# Patient Record
Sex: MALE | Race: BLACK OR AFRICAN AMERICAN | NOT HISPANIC OR LATINO | Employment: PART TIME | URBAN - METROPOLITAN AREA
[De-identification: names, ages, dates, MRNs, and addresses within clinical notes are randomized per-mention and may not be internally consistent; named-entity substitution may affect disease eponyms.]

---

## 2019-09-07 ENCOUNTER — HOSPITAL ENCOUNTER (EMERGENCY)
Facility: HOSPITAL | Age: 69
Discharge: HOME | End: 2019-09-07
Attending: EMERGENCY MEDICINE
Payer: MEDICARE

## 2019-09-07 VITALS
SYSTOLIC BLOOD PRESSURE: 177 MMHG | OXYGEN SATURATION: 98 % | RESPIRATION RATE: 18 BRPM | TEMPERATURE: 98 F | HEART RATE: 79 BPM | DIASTOLIC BLOOD PRESSURE: 89 MMHG

## 2019-09-07 DIAGNOSIS — R55 SYNCOPE: ICD-10-CM

## 2019-09-07 DIAGNOSIS — R11.10 VOMITING, INTRACTABILITY OF VOMITING NOT SPECIFIED, PRESENCE OF NAUSEA NOT SPECIFIED, UNSPECIFIED VOMITING TYPE: Primary | ICD-10-CM

## 2019-09-07 PROCEDURE — 99284 EMERGENCY DEPT VISIT MOD MDM: CPT | Mod: ,,, | Performed by: EMERGENCY MEDICINE

## 2019-09-07 PROCEDURE — 93010 EKG 12-LEAD: ICD-10-PCS | Mod: ,,, | Performed by: INTERNAL MEDICINE

## 2019-09-07 PROCEDURE — 99283 EMERGENCY DEPT VISIT LOW MDM: CPT | Mod: 25

## 2019-09-07 PROCEDURE — 93005 ELECTROCARDIOGRAM TRACING: CPT

## 2019-09-07 PROCEDURE — 99284 PR EMERGENCY DEPT VISIT,LEVEL IV: ICD-10-PCS | Mod: ,,, | Performed by: EMERGENCY MEDICINE

## 2019-09-07 PROCEDURE — 93010 ELECTROCARDIOGRAM REPORT: CPT | Mod: ,,, | Performed by: INTERNAL MEDICINE

## 2019-09-07 NOTE — ED PROVIDER NOTES
Encounter Date: 9/7/2019       History     Chief Complaint   Patient presents with    Pre Syncope     working in heat the past two days - N/V the past two days      Mr. Davison is a 68-year-old male brought in by EMS for evaluation of near syncopal event.  Patient states he had acute onset, severe, 8/10, cramping abdominal pain that began last night which was associated with vomiting. Symptoms were persistent until today while he was at work.  He states for the past 2 days he has been working outside in the heat and is felt dehydrated but unable to keep down liquids.  Last bowel movement was normal today.  Patient states while at work, he presented to paramedics to start an IV and give him 1.5 L of fluids.  He immediately felt resolution of symptoms.  He currently denies abdominal pain and feels his appetite has returned.        Review of patient's allergies indicates:  No Known Allergies  Past Medical History:   Diagnosis Date    Hypertension      Past Surgical History:   Procedure Laterality Date    FRACTURE SURGERY      LEG SURGERY Left      No family history on file.  Social History     Tobacco Use    Smoking status: Current Every Day Smoker     Packs/day: 0.50     Years: 45.00     Pack years: 22.50     Types: Cigarettes   Substance Use Topics    Alcohol use: No    Drug use: No     Review of Systems   Constitutional: Negative for chills and fever.   HENT: Negative for congestion and sore throat.    Eyes: Negative for visual disturbance.   Respiratory: Negative for cough and shortness of breath.    Cardiovascular: Negative for chest pain, palpitations and leg swelling.   Gastrointestinal: Positive for abdominal pain, nausea and vomiting.   Genitourinary: Negative for dysuria.   Musculoskeletal: Negative for back pain and joint swelling.   Skin: Negative for pallor.   Neurological: Positive for syncope (Near syncopal), weakness and light-headedness.   Psychiatric/Behavioral: Negative for confusion.        Physical Exam     Initial Vitals [09/07/19 1713]   BP Pulse Resp Temp SpO2   (!) 140/80 73 18 98.5 °F (36.9 °C) 100 %      MAP       --         Physical Exam    Nursing note and vitals reviewed.  Constitutional: He appears well-developed and well-nourished. No distress.   HENT:   Dry mucous members   Eyes: Conjunctivae and EOM are normal. Pupils are equal, round, and reactive to light.   Neck: Normal range of motion. Neck supple. No JVD present.   Cardiovascular: Normal rate, regular rhythm, normal heart sounds and intact distal pulses.   Pulmonary/Chest: Breath sounds normal. He has no wheezes. He has no rhonchi. He has no rales.   Abdominal: Soft. Bowel sounds are normal. He exhibits no distension. There is no tenderness. There is no rebound and no guarding.   Musculoskeletal: Normal range of motion. He exhibits no edema.   Neurological: He is alert and oriented to person, place, and time. He has normal strength. No cranial nerve deficit.   Skin: Skin is warm. Capillary refill takes less than 2 seconds.         ED Course   Procedures  Labs Reviewed - No data to display  EKG Readings: (Independently Interpreted)   EKG:  Sinus rhythm at 91, nonspecific ST wave change, no ST elevations or depression or ectopic beats.       Imaging Results    None          Medical Decision Making:   History:   I obtained history from: EMS provider and someone other than patient.       <> Summary of History: uncle at bedside, EMS  Old Medical Records: I decided to obtain old medical records.  Old Records Summarized: records from previous admission(s).       <> Summary of Records: 1/7/2019:  Last seen in the emergency department for arthritis of his left shoulder  Initial Assessment:   Urgent evaluation 68-year-old male history of hypertension brought in by EMS for near syncopal event.  Patient felt dehydrated after working in the heat for 2 days subsequent abdominal pain and vomiting.    On arrival patient's vital signs are  stable.  He had received 1.5 L of fluids per EMS.  CBG was normal. Initial blood pressure by  /70.  EKG reviewed with no evidence of ischemia, arrhythmia or ectopic beats    He currently states symptoms have resolved, he is declining further labs or evaluation at this time.  I have discussed with him the importance of checking labs as this could be secondary to heat exhaustion, rhabdomyolysis, acute renal failure or severe electrolyte derangement.  He expresses understanding and still wishes to leave without labs or IV fluids.    Patient is of sound mind, Uncle at bedside.  Patient states he has a primary care doctor that he will follow up with for further evaluation.  Patient stable for discharge                      Clinical Impression:       ICD-10-CM ICD-9-CM   1. Vomiting, intractability of vomiting not specified, presence of nausea not specified, unspecified vomiting type R11.10 787.03   2. Syncope R55 780.2         Disposition:   Disposition: Discharged  Condition: Stable                        Ayana Hernández MD  09/07/19 [id number]

## 2019-09-07 NOTE — ED NOTES
Discharge instructions and follow up reviewed.  Pt states no other questions or concerns at this time.  VSS and pt in NAD at discharge.